# Patient Record
Sex: FEMALE | Race: WHITE | NOT HISPANIC OR LATINO | Employment: FULL TIME | URBAN - METROPOLITAN AREA
[De-identification: names, ages, dates, MRNs, and addresses within clinical notes are randomized per-mention and may not be internally consistent; named-entity substitution may affect disease eponyms.]

---

## 2017-05-01 ENCOUNTER — OFFICE VISIT (OUTPATIENT)
Dept: URGENT CARE | Facility: PHYSICIAN GROUP | Age: 57
End: 2017-05-01
Payer: COMMERCIAL

## 2017-05-01 VITALS
TEMPERATURE: 99.7 F | OXYGEN SATURATION: 97 % | BODY MASS INDEX: 20.59 KG/M2 | WEIGHT: 152 LBS | HEIGHT: 72 IN | HEART RATE: 87 BPM | RESPIRATION RATE: 16 BRPM | SYSTOLIC BLOOD PRESSURE: 116 MMHG | DIASTOLIC BLOOD PRESSURE: 60 MMHG

## 2017-05-01 DIAGNOSIS — R21 RASH: ICD-10-CM

## 2017-05-01 PROCEDURE — 99204 OFFICE O/P NEW MOD 45 MIN: CPT | Performed by: FAMILY MEDICINE

## 2017-05-01 RX ORDER — IBUPROFEN 200 MG
600 TABLET ORAL EVERY 6 HOURS PRN
COMMUNITY

## 2017-05-01 RX ORDER — FAMOTIDINE 40 MG/1
40 TABLET, FILM COATED ORAL DAILY
Qty: 60 TAB | Refills: 0 | Status: SHIPPED | OUTPATIENT
Start: 2017-05-01 | End: 2017-07-04

## 2017-05-01 RX ORDER — DIPHENHYDRAMINE HCL 25 MG
25 TABLET ORAL EVERY 6 HOURS PRN
COMMUNITY

## 2017-05-01 RX ORDER — PREDNISONE 10 MG/1
TABLET ORAL
Qty: 91 TAB | Refills: 0 | Status: SHIPPED | OUTPATIENT
Start: 2017-05-01 | End: 2017-07-04

## 2017-05-01 RX ORDER — AMOXICILLIN AND CLAVULANATE POTASSIUM 875; 125 MG/1; MG/1
1 TABLET, FILM COATED ORAL 2 TIMES DAILY
Qty: 14 TAB | Refills: 0 | Status: SHIPPED | OUTPATIENT
Start: 2017-05-01 | End: 2017-05-08

## 2017-05-01 NOTE — MR AVS SNAPSHOT
Brittaney Kelley   2017 3:30 PM   Office Visit   MRN: [id number]    Department:  [facility]   Dept Phone:  [phone]    Description:  Female : 1960   Provider:  Alejandro Hand M.D.           Reason for Visit     Rash Painful, itchy rash all over body x 1 wk       Allergies as of 2017     No Known Allergies      You were diagnosed with     Rash   [[id number]]         Vital Signs     Blood Pressure Pulse Temperature Respirations Height Weight    116/60 mmHg 87 37.6 °C (99.7 °F) 16 1.829 m (6') 68.947 kg (152 lb)    Body Mass Index Oxygen Saturation                20.61 kg/m2 97%          Basic Information     Date Of Birth Sex Race Ethnicity Preferred Language    1960 Female White Non- English      Health Maintenance     Patient has no pending health maintenance at this time      Current Immunizations     No immunizations on file.      Below and/or attached are the medications your provider expects you to take. Review all of your home medications and newly ordered medications with your provider and/or pharmacist. Follow medication instructions as directed by your provider and/or pharmacist. Please keep your medication list with you and share with your provider. Update the information when medications are discontinued, doses are changed, or new medications (including over-the-counter products) are added; and carry medication information at all times in the event of emergency situations     Allergies:  No Known Allergies          Medications  Valid as of: May 01, 2017 -  4:12 PM    Generic Name Brand Name Tablet Size Instructions for use    DiphenhydrAMINE HCl (Tab) BENADRYL 25 MG Take 25 mg by mouth every 6 hours as needed for Sleep.        Ibuprofen (Tab) MOTRIN 200 MG Take 200 mg by mouth every 6 hours as needed.        Multiple Vitamin   Take  by mouth.        .                 Medicines prescribed today were sent to:     [facility]ACY #[id number] - [place], [place] - [address]       [address] [place] [address]    Phone: [phone] Fax: [phone]    Open 24 Hours?: No      Medication refill instructions:       If your prescription bottle indicates you have medication refills left, it is not necessary to call your provider’s office. Please contact your pharmacy and they will refill your medication.    If your prescription bottle indicates you do not have any refills left, you may request refills at any time through one of the following ways: The online [employer] system (except Urgent Care), by calling your provider’s office, or by asking your pharmacy to contact your provider’s office with a refill request. Medication refills are processed only during regular business hours and may not be available until the next business day. Your provider may request additional information or to have a follow-up visit with you prior to refilling your medication.   *Please Note: Medication refills are assigned a new Rx number when refilled electronically. Your pharmacy may indicate that no refills were authorized even though a new prescription for the same medication is available at the pharmacy. Please request the medicine by name with the pharmacy before contacting your provider for a refill.           [employer] Access Code: [email or web address]  Expires: 5/31/2017  3:32 PM    [employer]  A secure, online tool to manage your health information     [employer]’s [employer]® is a secure, online tool that connects you to your personalized health information from the privacy of your home -- day or night - making it very easy for you to manage your healthcare. Once the activation process is completed, you can even access your medical information using the [employer] dejah, which is available for free in the Apple Dejah store or Google Play store.     [employer] provides the following levels of access (as shown below):   My Chart Features   [facility] Doctor [facility]  Specialists [facility]  Urgent  Care Non-[facility]  Primary  Care  Doctor   Email your healthcare team securely and privately 24/7 X X X    Manage appointments: schedule your next appointment; view details of past/upcoming appointments X      Request prescription refills. X      View recent personal medical records, including lab and immunizations X X X X   View health record, including health history, allergies, medications X X X X   Read reports about your outpatient visits, procedures, consult and ER notes X X X X   See your discharge summary, which is a recap of your hospital and/or ER visit that includes your diagnosis, lab results, and care plan. X X       How to register for [employer]:  1. Go to  [email or web address].  2. Click on the Sign Up Now box, which takes you to the New Member Sign Up page. You will need to provide the following information:  a. Enter your [employer] Access Code exactly as it appears at the top of this page. (You will not need to use this code after you’ve completed the sign-up process. If you do not sign up before the expiration date, you must request a new code.)   b. Enter your date of birth.   c. Enter your home email address.   d. Click Submit, and follow the next screen’s instructions.  3. Create a [employer] ID. This will be your [employer] login ID and cannot be changed, so think of one that is secure and easy to remember.  4. Create a [employer] password. You can change your password at any time.  5. Enter your Password Reset Question and Answer. This can be used at a later time if you forget your password.   6. Enter your e-mail address. This allows you to receive e-mail notifications when new information is available in [employer].  7. Click Sign Up. You can now view your health information.    For assistance activating your [employer] account, call [phone]

## 2017-05-01 NOTE — Clinical Note
May 1, 2017         Patient: Brittaney Kelley   YOB: 1960   Date of Visit: 5/1/2017           To Whom it May Concern:    Brittaney Kelley was seen in my clinic on 5/1/2017. She may return to work on 5/4/2017..    If you have any questions or concerns, please don't hesitate to call.        Sincerely,           Alejandro Hand M.D.  Electronically Signed

## 2017-05-01 NOTE — Clinical Note
May 1, 2017         Patient: Brittaney Kelley   YOB: 1960   Date of Visit: 5/1/2017           To Whom it May Concern:    Brittaney Kelley was seen in my clinic on 5/1/2017. She may return to work on 5/3/2017..    If you have any questions or concerns, please don't hesitate to call.        Sincerely,           Alejandro Hand M.D.  Electronically Signed

## 2017-05-01 NOTE — PATIENT INSTRUCTIONS
Rash  A rash is a change in the color or texture of the skin. There are many different types of rashes. You may have other problems that accompany your rash.  CAUSES   · Infections.  · Allergic reactions. This can include allergies to pets or foods.  · Certain medicines.  · Exposure to certain chemicals, soaps, or cosmetics.  · Heat.  · Exposure to poisonous plants.  · Tumors, both cancerous and noncancerous.  SYMPTOMS   · Redness.  · Scaly skin.  · Itchy skin.  · Dry or cracked skin.  · Bumps.  · Blisters.  · Pain.  DIAGNOSIS   Your caregiver may do a physical exam to determine what type of rash you have. A skin sample (biopsy) may be taken and examined under a microscope.  TREATMENT   Treatment depends on the type of rash you have. Your caregiver may prescribe certain medicines. For serious conditions, you may need to see a skin doctor (dermatologist).  HOME CARE INSTRUCTIONS   · Avoid the substance that caused your rash.  · Do not scratch your rash. This can cause infection.  · You may take cool baths to help stop itching.  · Only take over-the-counter or prescription medicines as directed by your caregiver.  · Keep all follow-up appointments as directed by your caregiver.  SEEK IMMEDIATE MEDICAL CARE IF:  · You have increasing pain, swelling, or redness.  · You have a fever.  · You have new or severe symptoms.  · You have body aches, diarrhea, or vomiting.  · Your rash is not better after 3 days.  MAKE SURE YOU:  · Understand these instructions.  · Will watch your condition.  · Will get help right away if you are not doing well or get worse.     This information is not intended to replace advice given to you by your health care provider. Make sure you discuss any questions you have with your health care provider.     Document Released: 12/08/2003 Document Revised: 01/08/2016 Document Reviewed: 10/01/2012  [employer] Interactive Patient Education ©2016 [employer]

## 2017-05-16 ASSESSMENT — ENCOUNTER SYMPTOMS
VOMITING: 0
FEVER: 0

## 2017-05-16 NOTE — PROGRESS NOTES
Subjective:      Brittaney Kelley is a 56 y.o. female who presents with Rash            Rash  This is a new problem. The current episode started in the past 7 days. The problem has been rapidly worsening since onset. The rash is diffuse. The rash is characterized by pain and redness. She was exposed to an insect bite/sting. Pertinent negatives include no fever or vomiting.       Review of Systems   Constitutional: Negative for fever.   Gastrointestinal: Negative for vomiting.   Skin: Positive for rash.     PMH:  has no past medical history of Cancer (CMS-[facility]), Diabetes (CMS-HCC), Hypertension, or Hyperlipidemia.  MEDS:   Current outpatient prescriptions:   •  ibuprofen (MOTRIN) 200 MG Tab, Take 200 mg by mouth every 6 hours as needed., Disp: , Rfl:   •  diphenhydrAMINE (BENADRYL) 25 MG Tab, Take 25 mg by mouth every 6 hours as needed for Sleep., Disp: , Rfl:   •  Multiple Vitamin (DAILY VITAMINS PO), Take  by mouth., Disp: , Rfl:   •  predniSONE (DELTASONE) 10 MG Tab, Take 6 tablets daily for 7 days then take 4 tablets daily for 7 days then 2 tablets for 7 days then 1 tablet for 7 days., Disp: 91 Tab, Rfl: 0  •  famotidine (PEPCID) 40 MG Tab, Take 1 Tab by mouth every day., Disp: 60 Tab, Rfl: 0  ALLERGIES: No Known Allergies  SURGHX: History reviewed. No pertinent past surgical history.  SOCHX:  reports that she has quit smoking. She does not have any smokeless tobacco history on file. She reports that she drinks alcohol.  FH: family history is negative for Stroke and Heart Disease.      Objective:     /60 mmHg  Pulse 87  Temp(Src) 37.6 °C (99.7 °F)  Resp 16  Ht 1.829 m (6')  Wt 68.947 kg (152 lb)  BMI 20.61 kg/m2  SpO2 97%     Physical Exam   Constitutional: She is oriented to person, place, and time. She appears well-developed and well-nourished. No distress.   HENT:   Head: Normocephalic and atraumatic.   Eyes: Conjunctivae and EOM are normal. Pupils are equal, round, and reactive to light.    Cardiovascular: Normal rate and regular rhythm.    No murmur heard.  Pulmonary/Chest: Effort normal and breath sounds normal. No respiratory distress.   Abdominal: Soft. She exhibits no distension. There is no tenderness.   Neurological: She is alert and oriented to person, place, and time. She has normal reflexes. No sensory deficit.   Skin: Skin is warm and dry. Rash noted. Rash is pustular.   Psychiatric: She has a normal mood and affect.               Assessment/Plan:     1. Rash  Differential diagnosis, natural history, supportive care, and indications for immediate follow-up discussed.   - amoxicillin-clavulanate (AUGMENTIN) 875-125 MG Tab; Take 1 Tab by mouth 2 times a day for 7 days.  Dispense: 14 Tab; Refill: 0  - predniSONE (DELTASONE) 10 MG Tab; Take 6 tablets daily for 7 days then take 4 tablets daily for 7 days then 2 tablets for 7 days then 1 tablet for 7 days.  Dispense: 91 Tab; Refill: 0  - famotidine (PEPCID) 40 MG Tab; Take 1 Tab by mouth every day.  Dispense: 60 Tab; Refill: 0

## 2017-07-04 ENCOUNTER — HOSPITAL ENCOUNTER (INPATIENT)
Facility: MEDICAL CENTER | Age: 57
LOS: 2 days | DRG: 603 | End: 2017-07-06
Attending: EMERGENCY MEDICINE | Admitting: INTERNAL MEDICINE
Payer: COMMERCIAL

## 2017-07-04 ENCOUNTER — OFFICE VISIT (OUTPATIENT)
Dept: URGENT CARE | Facility: PHYSICIAN GROUP | Age: 57
End: 2017-07-04
Payer: COMMERCIAL

## 2017-07-04 ENCOUNTER — RESOLUTE PROFESSIONAL BILLING HOSPITAL PROF FEE (OUTPATIENT)
Dept: HOSPITALIST | Facility: MEDICAL CENTER | Age: 57
End: 2017-07-04
Payer: COMMERCIAL

## 2017-07-04 VITALS
BODY MASS INDEX: 20.45 KG/M2 | OXYGEN SATURATION: 96 % | HEIGHT: 72 IN | SYSTOLIC BLOOD PRESSURE: 108 MMHG | HEART RATE: 74 BPM | DIASTOLIC BLOOD PRESSURE: 60 MMHG | WEIGHT: 151 LBS | TEMPERATURE: 99 F

## 2017-07-04 DIAGNOSIS — L03.116 BILATERAL CELLULITIS OF LOWER LEG: ICD-10-CM

## 2017-07-04 DIAGNOSIS — L03.115 BILATERAL CELLULITIS OF LOWER LEG: ICD-10-CM

## 2017-07-04 DIAGNOSIS — L03.119 CELLULITIS OF LOWER EXTREMITY, UNSPECIFIED LATERALITY: ICD-10-CM

## 2017-07-04 LAB
ALBUMIN SERPL BCP-MCNC: 3.9 G/DL (ref 3.2–4.9)
ALBUMIN/GLOB SERPL: 1.1 G/DL
ALP SERPL-CCNC: 75 U/L (ref 30–99)
ALT SERPL-CCNC: 11 U/L (ref 2–50)
ANION GAP SERPL CALC-SCNC: 10 MMOL/L (ref 0–11.9)
APPEARANCE UR: CLEAR
APTT PPP: 29.9 SEC (ref 24.7–36)
AST SERPL-CCNC: 17 U/L (ref 12–45)
BACTERIA #/AREA URNS HPF: ABNORMAL /HPF
BASOPHILS # BLD AUTO: 1 % (ref 0–1.8)
BASOPHILS # BLD: 0.11 K/UL (ref 0–0.12)
BILIRUB SERPL-MCNC: 0.4 MG/DL (ref 0.1–1.5)
BILIRUB UR QL STRIP.AUTO: NEGATIVE
BUN SERPL-MCNC: 21 MG/DL (ref 8–22)
CALCIUM SERPL-MCNC: 9.3 MG/DL (ref 8.5–10.5)
CHLORIDE SERPL-SCNC: 106 MMOL/L (ref 96–112)
CO2 SERPL-SCNC: 23 MMOL/L (ref 20–33)
COLOR UR: YELLOW
CREAT SERPL-MCNC: 0.96 MG/DL (ref 0.5–1.4)
CRP SERPL HS-MCNC: 4.14 MG/DL (ref 0–0.75)
EOSINOPHIL # BLD AUTO: 0.38 K/UL (ref 0–0.51)
EOSINOPHIL NFR BLD: 3.3 % (ref 0–6.9)
EPI CELLS #/AREA URNS HPF: NEGATIVE /HPF
ERYTHROCYTE [DISTWIDTH] IN BLOOD BY AUTOMATED COUNT: 45.8 FL (ref 35.9–50)
ERYTHROCYTE [SEDIMENTATION RATE] IN BLOOD BY WESTERGREN METHOD: 47 MM/HOUR (ref 0–30)
GFR SERPL CREATININE-BSD FRML MDRD: >60 ML/MIN/1.73 M 2
GLOBULIN SER CALC-MCNC: 3.4 G/DL (ref 1.9–3.5)
GLUCOSE SERPL-MCNC: 89 MG/DL (ref 65–99)
GLUCOSE UR STRIP.AUTO-MCNC: NEGATIVE MG/DL
GRAM STN SPEC: NORMAL
HCT VFR BLD AUTO: 40.1 % (ref 37–47)
HGB BLD-MCNC: 13.6 G/DL (ref 12–16)
HYALINE CASTS #/AREA URNS LPF: ABNORMAL /LPF
IMM GRANULOCYTES # BLD AUTO: 0.03 K/UL (ref 0–0.11)
IMM GRANULOCYTES NFR BLD AUTO: 0.3 % (ref 0–0.9)
INR PPP: 0.93 (ref 0.87–1.13)
KETONES UR STRIP.AUTO-MCNC: NEGATIVE MG/DL
LACTATE BLD-SCNC: 1.1 MMOL/L (ref 0.5–2)
LEUKOCYTE ESTERASE UR QL STRIP.AUTO: ABNORMAL
LYMPHOCYTES # BLD AUTO: 3.42 K/UL (ref 1–4.8)
LYMPHOCYTES NFR BLD: 30.1 % (ref 22–41)
MCH RBC QN AUTO: 32.4 PG (ref 27–33)
MCHC RBC AUTO-ENTMCNC: 33.9 G/DL (ref 33.6–35)
MCV RBC AUTO: 95.5 FL (ref 81.4–97.8)
MICRO URNS: ABNORMAL
MONOCYTES # BLD AUTO: 0.95 K/UL (ref 0–0.85)
MONOCYTES NFR BLD AUTO: 8.4 % (ref 0–13.4)
MRSA DNA SPEC QL NAA+PROBE: NORMAL
NEUTROPHILS # BLD AUTO: 6.47 K/UL (ref 2–7.15)
NEUTROPHILS NFR BLD: 56.9 % (ref 44–72)
NITRITE UR QL STRIP.AUTO: POSITIVE
NRBC # BLD AUTO: 0 K/UL
NRBC BLD AUTO-RTO: 0 /100 WBC
PH UR STRIP.AUTO: 5.5 [PH]
PLATELET # BLD AUTO: 434 K/UL (ref 164–446)
PMV BLD AUTO: 9.8 FL (ref 9–12.9)
POTASSIUM SERPL-SCNC: 3.9 MMOL/L (ref 3.6–5.5)
PROT SERPL-MCNC: 7.3 G/DL (ref 6–8.2)
PROT UR QL STRIP: NEGATIVE MG/DL
PROTHROMBIN TIME: 12.8 SEC (ref 12–14.6)
RBC # BLD AUTO: 4.2 M/UL (ref 4.2–5.4)
RBC # URNS HPF: ABNORMAL /HPF
RBC UR QL AUTO: ABNORMAL
SIGNIFICANT IND 70042: NORMAL
SIGNIFICANT IND 70042: NORMAL
SITE SITE: NORMAL
SITE SITE: NORMAL
SODIUM SERPL-SCNC: 139 MMOL/L (ref 135–145)
SOURCE SOURCE: NORMAL
SOURCE SOURCE: NORMAL
SP GR UR STRIP.AUTO: 1.01
WBC # BLD AUTO: 11.4 K/UL (ref 4.8–10.8)
WBC #/AREA URNS HPF: ABNORMAL /HPF

## 2017-07-04 PROCEDURE — A9270 NON-COVERED ITEM OR SERVICE: HCPCS | Performed by: INTERNAL MEDICINE

## 2017-07-04 PROCEDURE — 85730 THROMBOPLASTIN TIME PARTIAL: CPT

## 2017-07-04 PROCEDURE — 700111 HCHG RX REV CODE 636 W/ 250 OVERRIDE (IP): Performed by: INTERNAL MEDICINE

## 2017-07-04 PROCEDURE — 87641 MR-STAPH DNA AMP PROBE: CPT

## 2017-07-04 PROCEDURE — 85610 PROTHROMBIN TIME: CPT

## 2017-07-04 PROCEDURE — 96366 THER/PROPH/DIAG IV INF ADDON: CPT

## 2017-07-04 PROCEDURE — 94760 N-INVAS EAR/PLS OXIMETRY 1: CPT

## 2017-07-04 PROCEDURE — 99285 EMERGENCY DEPT VISIT HI MDM: CPT

## 2017-07-04 PROCEDURE — 93970 EXTREMITY STUDY: CPT

## 2017-07-04 PROCEDURE — 700105 HCHG RX REV CODE 258: Performed by: INTERNAL MEDICINE

## 2017-07-04 PROCEDURE — 85025 COMPLETE CBC W/AUTO DIFF WBC: CPT

## 2017-07-04 PROCEDURE — 700111 HCHG RX REV CODE 636 W/ 250 OVERRIDE (IP): Performed by: EMERGENCY MEDICINE

## 2017-07-04 PROCEDURE — 96367 TX/PROPH/DG ADDL SEQ IV INF: CPT

## 2017-07-04 PROCEDURE — 700105 HCHG RX REV CODE 258

## 2017-07-04 PROCEDURE — 87205 SMEAR GRAM STAIN: CPT

## 2017-07-04 PROCEDURE — 700102 HCHG RX REV CODE 250 W/ 637 OVERRIDE(OP): Performed by: INTERNAL MEDICINE

## 2017-07-04 PROCEDURE — 99223 1ST HOSP IP/OBS HIGH 75: CPT | Performed by: INTERNAL MEDICINE

## 2017-07-04 PROCEDURE — 87040 BLOOD CULTURE FOR BACTERIA: CPT | Mod: 91

## 2017-07-04 PROCEDURE — 81001 URINALYSIS AUTO W/SCOPE: CPT

## 2017-07-04 PROCEDURE — 87077 CULTURE AEROBIC IDENTIFY: CPT

## 2017-07-04 PROCEDURE — 87070 CULTURE OTHR SPECIMN AEROBIC: CPT

## 2017-07-04 PROCEDURE — 83605 ASSAY OF LACTIC ACID: CPT

## 2017-07-04 PROCEDURE — 85652 RBC SED RATE AUTOMATED: CPT

## 2017-07-04 PROCEDURE — 700111 HCHG RX REV CODE 636 W/ 250 OVERRIDE (IP)

## 2017-07-04 PROCEDURE — 80053 COMPREHEN METABOLIC PANEL: CPT

## 2017-07-04 PROCEDURE — 86140 C-REACTIVE PROTEIN: CPT

## 2017-07-04 PROCEDURE — 770006 HCHG ROOM/CARE - MED/SURG/GYN SEMI*

## 2017-07-04 PROCEDURE — 87186 SC STD MICRODIL/AGAR DIL: CPT

## 2017-07-04 PROCEDURE — 700105 HCHG RX REV CODE 258: Performed by: EMERGENCY MEDICINE

## 2017-07-04 PROCEDURE — 96365 THER/PROPH/DIAG IV INF INIT: CPT

## 2017-07-04 RX ORDER — LABETALOL HYDROCHLORIDE 5 MG/ML
10 INJECTION, SOLUTION INTRAVENOUS EVERY 4 HOURS PRN
Status: DISCONTINUED | OUTPATIENT
Start: 2017-07-04 | End: 2017-07-05

## 2017-07-04 RX ORDER — AMOXICILLIN 250 MG
2 CAPSULE ORAL 2 TIMES DAILY
Status: DISCONTINUED | OUTPATIENT
Start: 2017-07-04 | End: 2017-07-06 | Stop reason: HOSPADM

## 2017-07-04 RX ORDER — ACETAMINOPHEN 325 MG/1
650 TABLET ORAL EVERY 6 HOURS PRN
Status: ON HOLD | COMMUNITY
End: 2017-07-06

## 2017-07-04 RX ORDER — DIPHENHYDRAMINE HYDROCHLORIDE 50 MG/ML
12.5 INJECTION INTRAMUSCULAR; INTRAVENOUS EVERY 6 HOURS PRN
Status: DISCONTINUED | OUTPATIENT
Start: 2017-07-04 | End: 2017-07-05

## 2017-07-04 RX ORDER — M-VIT,TX,IRON,MINS/CALC/FOLIC 27MG-0.4MG
1 TABLET ORAL DAILY
Status: DISCONTINUED | OUTPATIENT
Start: 2017-07-05 | End: 2017-07-06 | Stop reason: HOSPADM

## 2017-07-04 RX ORDER — KETOROLAC TROMETHAMINE 30 MG/ML
30 INJECTION, SOLUTION INTRAMUSCULAR; INTRAVENOUS EVERY 6 HOURS PRN
Status: DISCONTINUED | OUTPATIENT
Start: 2017-07-04 | End: 2017-07-05

## 2017-07-04 RX ORDER — LORATADINE 10 MG/1
10 TABLET ORAL DAILY
Status: DISCONTINUED | OUTPATIENT
Start: 2017-07-04 | End: 2017-07-06 | Stop reason: HOSPADM

## 2017-07-04 RX ORDER — ACETAMINOPHEN 325 MG/1
650 TABLET ORAL EVERY 6 HOURS PRN
Status: DISCONTINUED | OUTPATIENT
Start: 2017-07-04 | End: 2017-07-05

## 2017-07-04 RX ORDER — SODIUM CHLORIDE 9 MG/ML
INJECTION, SOLUTION INTRAVENOUS CONTINUOUS
Status: DISCONTINUED | OUTPATIENT
Start: 2017-07-04 | End: 2017-07-06 | Stop reason: HOSPADM

## 2017-07-04 RX ORDER — BISACODYL 10 MG
10 SUPPOSITORY, RECTAL RECTAL
Status: DISCONTINUED | OUTPATIENT
Start: 2017-07-04 | End: 2017-07-06 | Stop reason: HOSPADM

## 2017-07-04 RX ORDER — AMPICILLIN AND SULBACTAM 2; 1 G/1; G/1
3 INJECTION, POWDER, FOR SOLUTION INTRAMUSCULAR; INTRAVENOUS ONCE
Status: COMPLETED | OUTPATIENT
Start: 2017-07-04 | End: 2017-07-04

## 2017-07-04 RX ORDER — POLYETHYLENE GLYCOL 3350 17 G/17G
1 POWDER, FOR SOLUTION ORAL
Status: DISCONTINUED | OUTPATIENT
Start: 2017-07-04 | End: 2017-07-06 | Stop reason: HOSPADM

## 2017-07-04 RX ORDER — IBUPROFEN 600 MG/1
600 TABLET ORAL EVERY 6 HOURS PRN
Status: DISCONTINUED | OUTPATIENT
Start: 2017-07-04 | End: 2017-07-06 | Stop reason: HOSPADM

## 2017-07-04 RX ORDER — M-VIT,TX,IRON,MINS/CALC/FOLIC 27MG-0.4MG
1 TABLET ORAL DAILY
COMMUNITY

## 2017-07-04 RX ADMIN — VANCOMYCIN HYDROCHLORIDE 1700 MG: 100 INJECTION, POWDER, LYOPHILIZED, FOR SOLUTION INTRAVENOUS at 15:49

## 2017-07-04 RX ADMIN — SODIUM CHLORIDE: 9 INJECTION, SOLUTION INTRAVENOUS at 15:50

## 2017-07-04 RX ADMIN — SODIUM CHLORIDE: 9 INJECTION, SOLUTION INTRAVENOUS at 23:57

## 2017-07-04 RX ADMIN — AMPICILLIN SODIUM AND SULBACTAM SODIUM 3 G: 2; 1 INJECTION, POWDER, FOR SOLUTION INTRAMUSCULAR; INTRAVENOUS at 23:57

## 2017-07-04 RX ADMIN — AMPICILLIN SODIUM AND SULBACTAM SODIUM 3 G: 2; 1 INJECTION, POWDER, FOR SOLUTION INTRAMUSCULAR; INTRAVENOUS at 15:15

## 2017-07-04 RX ADMIN — LORATADINE 10 MG: 10 TABLET ORAL at 20:11

## 2017-07-04 ASSESSMENT — LIFESTYLE VARIABLES
ALCOHOL_USE: NO
EVER_SMOKED: YES
DO YOU DRINK ALCOHOL: NO

## 2017-07-04 ASSESSMENT — PAIN SCALES - GENERAL
PAINLEVEL_OUTOF10: 8
PAINLEVEL_OUTOF10: 3

## 2017-07-04 NOTE — IP AVS SNAPSHOT
Home Care Instructions                                                                                                                  Name:Brittaney Kelley  Medical Record Number:[id number]  CSN: [id number]    YOB: 1960   Age: 56 y.o.  Sex: female  HT:1.829 m (6') WT: 64.7 kg (142 lb 10.2 oz)          Admit Date: 7/4/2017     Discharge Date:   Today's Date: 7/6/2017  Attending Doctor:  Anuel Lara M.D.                  Allergies:  Review of patient's allergies indicates no known allergies.            Discharge Instructions       Discharge Instructions    Discharged to home by car with self. Discharged via walking, hospital escort: Yes.  Special equipment needed: Not Applicable    Be sure to schedule a follow-up appointment with your primary care doctor or any specialists as instructed.     Discharge Plan:   Diet Plan: Discussed  Activity Level: Discussed  Smoking Cessation Offered: Patient Refused  Confirmed Follow up Appointment: Patient to Call and Schedule Appointment  Confirmed Symptoms Management: Discussed  Medication Reconciliation Updated: Yes  Influenza Vaccine Indication: Patient Refuses    I understand that a diet low in cholesterol, fat, and sodium is recommended for good health. Unless I have been given specific instructions below for another diet, I accept this instruction as my diet prescription.   Other diet: regular      Special Instructions: None    · Is patient discharged on Warfarin / Coumadin?   No     · Is patient Post Blood Transfusion?  No  Cellulitis  Cellulitis is an infection of the skin and the tissue under the skin. The infected area is usually red and tender. This happens most often in the arms and lower legs.  HOME CARE   · Take your antibiotic medicine as told. Finish the medicine even if you start to feel better.  · Keep the infected arm or leg raised (elevated).  · Put a warm cloth on the area up to 4 times per day.  · Only take medicines as told by your  doctor.  · Keep all doctor visits as told.  GET HELP IF:  · You see red streaks on the skin coming from the infected area.  · Your red area gets bigger or turns a dark color.  · Your bone or joint under the infected area is painful after the skin heals.  · Your infection comes back in the same area or different area.  · You have a puffy (swollen) bump in the infected area.  · You have new symptoms.  · You have a fever.  GET HELP RIGHT AWAY IF:   · You feel very sleepy.  · You throw up (vomit) or have watery poop (diarrhea).  · You feel sick and have muscle aches and pains.  MAKE SURE YOU:   · Understand these instructions.  · Will watch your condition.  · Will get help right away if you are not doing well or get worse.     This information is not intended to replace advice given to you by your health care provider. Make sure you discuss any questions you have with your health care provider.     Document Released: 06/05/2009 Document Revised: 01/08/2016 Document Reviewed: 03/04/2013  [employer] Interactive Patient Education ©2016 [employer]      Depression / Suicide Risk    As you are discharged from this [facility] facility, it is important to learn how to keep safe from harming yourself.    Recognize the warning signs:  · Abrupt changes in personality, positive or negative- including increase in energy   · Giving away possessions  · Change in eating patterns- significant weight changes-  positive or negative  · Change in sleeping patterns- unable to sleep or sleeping all the time   · Unwillingness or inability to communicate  · Depression  · Unusual sadness, discouragement and loneliness  · Talk of wanting to die  · Neglect of personal appearance   · Rebelliousness- reckless behavior  · Withdrawal from people/activities they love  · Confusion- inability to concentrate     If you or a loved one observes any of these behaviors or has concerns about self-harm, here's what you can do:  · Talk about it- your  feelings and reasons for harming yourself  · Remove any means that you might use to hurt yourself (examples: pills, rope, extension cords, firearm)  · Get professional help from the community (Mental Health, Substance Abuse, psychological counseling)  · Do not be alone:Call your Safe Contact- someone whom you trust who will be there for you.  · Call your local CRISIS HOTLINE 279-3132 or 839-375-5493  · Call your local Children's Mobile Crisis Response Team [place] (897) 916-4004 or wwwBABADU  · Call the toll free National Suicide Prevention Hotlines   · National Suicide Prevention Lifeline 441-313-ZCCX (3483)  · [employer] Hope Line Network 800-SUICIDE (335-0454)             Discharge Medication Instructions:    Below are the medications your physician expects you to take upon discharge:    Review all your home medications and newly ordered medications with your doctor and/or pharmacist. Follow medication instructions as directed by your doctor and/or pharmacist.    Please keep your medication list with you and share with your physician.               Medication List      START taking these medications        Instructions    Morning Afternoon Evening Bedtime    amoxicillin-clavulanate 875-125 MG Tabs   Commonly known as:  AUGMENTIN        Take 1 Tab by mouth 2 times a day.   Dose:  1 Tab                        diphenhydramine-ZnAcetate 1-0.1 % Crea   Last time this was given:  7/6/2017  9:00 AM   Commonly known as:  BENADRYL ITCH                             hydrOXYzine 25 MG Tabs   Commonly known as:  ATARAX        Take 1 Tab by mouth 3 times a day as needed for Itching.   Dose:  25 mg                          CHANGE how you take these medications        Instructions    Morning Afternoon Evening Bedtime    acetaminophen 325 MG Tabs   What changed:  reasons to take this   Commonly known as:  TYLENOL        Take 2 Tabs by mouth every 6 hours as needed for Fever or Moderate Pain (Temp >101.5F).   Dose:   650 mg                          CONTINUE taking these medications        Instructions    Morning Afternoon Evening Bedtime    acidophilus/bulgaricus Tabs tablet   Commonly known as:  LACTINEX        Take 2 Tabs by mouth 3 times a day, with meals.   Dose:  2 Tab                        diphenhydrAMINE 25 MG Tabs   Commonly known as:  BENADRYL        Take 25 mg by mouth every 6 hours as needed for Itching.   Dose:  25 mg                        ibuprofen 200 MG Tabs   Last time this was given:  600 mg on 7/5/2017 10:02 PM   Commonly known as:  MOTRIN        Take 600 mg by mouth every 6 hours as needed for Mild Pain or Inflammation.   Dose:  600 mg                        therapeutic multivitamin-minerals Tabs   Last time this was given:  1 Tab on 7/6/2017  9:00 AM        Take 1 Tab by mouth every day.   Dose:  1 Tab                             Where to Get Your Medications      These medications were sent to [facility]ARMACY #[id number] - [place], [place] - [address]  [address], [place] [address]     Phone:  [phone]    - amoxicillin-clavulanate 875-125 MG Tabs  - hydrOXYzine 25 MG Tabs            Instructions           Diet / Nutrition:    Follow any diet instructions given to you by your doctor or the dietician, including how much salt (sodium) you are allowed each day.    If you are overweight, talk to your doctor about a weight reduction plan.    Activity:    Remain physically active following your doctor's instructions about exercise and activity.    Rest often.     Any time you become even a little tired or short of breath, SIT DOWN and rest.    Worsening Symptoms:    Report any of the following signs and symptoms to the doctor's office immediately:    *Pain of jaw, arm, or neck  *Chest pain not relieved by medication                               *Dizziness or loss of consciousness  *Difficulty breathing even when at rest   *More tired than usual                                       *Bleeding drainage or swelling of  surgical site  *Swelling of feet, ankles, legs or stomach                 *Fever (>100ºF)  *Pink or blood tinged sputum  *Weight gain (3lbs/day or 5lbs /week)           *Shock from internal defibrillator (if applicable)  *Palpitations or irregular heartbeats                *Cool and/or numb extremities    Stroke Awareness    Common Risk Factors for Stroke include:    Age  Atrial Fibrillation  Carotid Artery Stenosis  Diabetes Mellitus  Excessive alcohol consumption  High blood pressure  Overweight   Physical inactivity  Smoking    Warning signs and symptoms of a stroke include:    *Sudden numbness or weakness of the face, arm or leg (especially on one side of the body).  *Sudden confusion, trouble speaking or understanding.  *Sudden trouble seeing in one or both eyes.  *Sudden trouble walking, dizziness, loss of balance or coordination.Sudden severe headache with no known cause.    It is very important to get treatment quickly when a stroke occurs. If you experience any of the above warning signs, call 911 immediately.                   Disclaimer         Quit Smoking / Tobacco Use:    I understand the use of any tobacco products increases my chance of suffering from future heart disease or stroke and could cause other illnesses which may shorten my life. Quitting the use of tobacco products is the single most important thing I can do to improve my health. For further information on smoking / tobacco cessation call a Toll Free Quit Line at 1-660.460.7369 (*National Cancer [place]) or 1-375.385.2413 (American Lung Association) or you can access the web based program at www.lungusa.org.    [place] Tobacco Users Help Line:  (433) 525-7883       Toll Free: 1-792.382.2267  Quit Tobacco Program [facility] [phone]    Crisis Hotline:    [place] Crisis Hotline:  0-669-TXLOAEU or 1-919.121.8854    [place] Crisis Hotline:    1-729.864.5324 or 583-627-4253    Discharge Survey:   Thank you for  choosing [facility]. We hope we did everything we could to make your hospital stay a pleasant one. You may be receiving a phone survey and we would appreciate your time and participation in answering the questions. Your input is very valuable to us in our efforts to improve our service to our patients and their families.        My signature on this form indicates that:    1. I have reviewed and understand the above information.  2. My questions regarding this information have been answered to my satisfaction.  3. I have formulated a plan with my discharge nurse to obtain my prescribed medications for home.                  Disclaimer         __________________________________                     __________       ________                       Patient Signature                                                 Date                    Time

## 2017-07-04 NOTE — IP AVS SNAPSHOT
[facility] Access Code: [email or web address]  Expires: 8/3/2017  2:02 PM    [employer]  A secure, online tool to manage your health information     [employer]’s [employer]® is a secure, online tool that connects you to your personalized health information from the privacy of your home -- day or night - making it very easy for you to manage your healthcare. Once the activation process is completed, you can even access your medical information using the [employer] dejah, which is available for free in the Apple Dejah store or Google Play store.     [employer] provides the following levels of access (as shown below):   My Chart Features   [facility] Primary Care Doctor [facility]  Specialists [facility]  Urgent  Care Non-[facility]  Primary Care  Doctor   Email your healthcare team securely and privately 24/7 X X X X   Manage appointments: schedule your next appointment; view details of past/upcoming appointments X      Request prescription refills. X      View recent personal medical records, including lab and immunizations X X X X   View health record, including health history, allergies, medications X X X X   Read reports about your outpatient visits, procedures, consult and ER notes X X X X   See your discharge summary, which is a recap of your hospital and/or ER visit that includes your diagnosis, lab results, and care plan. X X       How to register for [employer]:  1. Go to  [email or web address].  2. Click on the Sign Up Now box, which takes you to the New Member Sign Up page. You will need to provide the following information:  a. Enter your [facility] Access Code exactly as it appears at the top of this page. (You will not need to use this code after you’ve completed the sign-up process. If you do not sign up before the expiration date, you must request a new code.)   b. Enter your date of birth.   c. Enter your home email address.   d. Click Submit, and follow the next screen’s instructions.  3. Create a [facility] ID. This will be your [facility]  login ID and cannot be changed, so think of one that is secure and easy to remember.  4. Create a [facility] password. You can change your password at any time.  5. Enter your Password Reset Question and Answer. This can be used at a later time if you forget your password.   6. Enter your e-mail address. This allows you to receive e-mail notifications when new information is available in [facility].  7. Click Sign Up. You can now view your health information.    For assistance activating your [facility] account, call [phone]

## 2017-07-04 NOTE — IP AVS SNAPSHOT
" <p align=\"LEFT\"><IMG SRC=\"//[email or web address]\" alt=\"Image\" WIDTH=\"50%\" HEIGHT=\"200\" BORDER=\"\"></p>                   Name:Brittaney Kelley  Medical Record Number:[id number]  CSN: [id number]    YOB: 1960   Age: 56 y.o.  Sex: female  HT:1.829 m (6') WT: 64.7 kg (142 lb 10.2 oz)          Admit Date: 7/4/2017     Discharge Date:   Today's Date: 7/6/2017  Attending Doctor:  Anuel Lara M.D.                  Allergies:  Review of patient's allergies indicates no known allergies.             Medication List      Take these Medications        Instructions    acetaminophen 325 MG Tabs   What changed:  reasons to take this   Commonly known as:  TYLENOL    Take 2 Tabs by mouth every 6 hours as needed for Fever or Moderate Pain (Temp >101.5F).   Dose:  650 mg       acidophilus/bulgaricus Tabs tablet   Commonly known as:  LACTINEX    Take 2 Tabs by mouth 3 times a day, with meals.   Dose:  2 Tab       amoxicillin-clavulanate 875-125 MG Tabs   Commonly known as:  AUGMENTIN    Take 1 Tab by mouth 2 times a day.   Dose:  1 Tab       diphenhydrAMINE 25 MG Tabs   Commonly known as:  BENADRYL    Take 25 mg by mouth every 6 hours as needed for Itching.   Dose:  25 mg       diphenhydramine-ZnAcetate 1-0.1 % Crea   Commonly known as:  BENADRYL ITCH        hydrOXYzine 25 MG Tabs   Commonly known as:  ATARAX    Take 1 Tab by mouth 3 times a day as needed for Itching.   Dose:  25 mg       ibuprofen 200 MG Tabs   Commonly known as:  MOTRIN    Take 600 mg by mouth every 6 hours as needed for Mild Pain or Inflammation.   Dose:  600 mg       therapeutic multivitamin-minerals Tabs    Take 1 Tab by mouth every day.   Dose:  1 Tab         "

## 2017-07-04 NOTE — ED NOTES
"Pt ambulatory to triage , states \" she has had problems with her legs on/off for years\" , was seen at the Urgent Care today and sent here, pt has bilateral leg redness/swelling/ and multiple open wounds to legs bilat   "

## 2017-07-04 NOTE — IP AVS SNAPSHOT
7/6/2017    Brittaney Kelley  [address]  [place] [address]    Dear Brittaney:    [facility] wants to ensure your discharge home is safe and you or your loved ones have had all of your questions answered regarding your care after you leave the hospital.    Below is a list of resources and contact information should you have any questions regarding your hospital stay, follow-up instructions, or active medical symptoms.    Questions or Concerns Regarding… Contact   Medical Questions Related to Your Discharge  (7 days a week, 8am-5pm) Contact a Nurse Care Coordinator   [phone]   Medical Questions Not Related to Your Discharge  (24 hours a day / 7 days a week)  Contact the Nurse Health Line   [phone]    Medications or Discharge Instructions Refer to your discharge packet   or contact your [facility] Primary Care Provider   [phone]   Follow-up Appointment(s) Schedule your appointment via "[employer]   or contact Scheduling [phone]   Billing Review your statement via "[employer]  or contact Billing [phone]   Medical Records Review your records via "[employer]   or contact Medical Records [phone]     You may receive a telephone call within two days of discharge. This call is to make certain you understand your discharge instructions and have the opportunity to have any questions answered. You can also easily access your medical information, test results and upcoming appointments via the "[employer] free online health management tool. You can learn more and sign up at [employer]. For assistance setting up your "[employer] account, please call [phone].    Once again, we want to ensure your discharge home is safe and that you have a clear understanding of any next steps in your care. If you have any questions or concerns, please do not hesitate to contact us, we are here for you. Thank you for choosing [facility] for your healthcare needs.    Sincerely,    Your [facility] Healthcare Team

## 2017-07-04 NOTE — MR AVS SNAPSHOT
"        Brittaney Kelley   2017 12:10 PM   Office Visit   MRN: [id number]    Department:  [facility]   Dept Phone:  [phone]    Description:  Female : 1960   Provider:  Enedina Holloway PA-C           Reason for Visit     Rash Bilateral redness, swelling, itching and pain of lower legs for several months. Previous tx not helping      Allergies as of 2017     No Known Allergies      Vital Signs     Blood Pressure Pulse Temperature Height Weight Body Mass Index    108/60 mmHg 74 37.2 °C (99 °F) 1.829 m (6' 0.01\") 68.493 kg (151 lb) 20.47 kg/m2    Oxygen Saturation Smoking Status                96% Former Smoker          Basic Information     Date Of Birth Sex Race Ethnicity Preferred Language    1960 Female White Non- English      Health Maintenance        Date Due Completion Dates    IMM DTaP/Tdap/Td Vaccine (1 - Tdap) 1981 ---    PAP SMEAR 1983 ---    MAMMOGRAM 2002 ---    COLONOSCOPY 2012 ---    IMM INFLUENZA (1) 2017 ---            Current Immunizations     No immunizations on file.      Below and/or attached are the medications your provider expects you to take. Review all of your home medications and newly ordered medications with your provider and/or pharmacist. Follow medication instructions as directed by your provider and/or pharmacist. Please keep your medication list with you and share with your provider. Update the information when medications are discontinued, doses are changed, or new medications (including over-the-counter products) are added; and carry medication information at all times in the event of emergency situations     Allergies:  No Known Allergies          Medications  Valid as of: 2017 -  2:02 PM    Generic Name Brand Name Tablet Size Instructions for use    DiphenhydrAMINE HCl (Tab) BENADRYL 25 MG Take 25 mg by mouth every 6 hours as needed for Sleep.        Famotidine (Tab) PEPCID 40 MG Take 1 Tab by mouth " every day.        Ibuprofen (Tab) MOTRIN 200 MG Take 200 mg by mouth every 6 hours as needed.        Multiple Vitamin   Take  by mouth.        PredniSONE (Tab) DELTASONE 10 MG Take 6 tablets daily for 7 days then take 4 tablets daily for 7 days then 2 tablets for 7 days then 1 tablet for 7 days.        .                 Medicines prescribed today were sent to:     [id number] - [place] - [address] [place] [address]    Phone: [phone] Fax: [phone]    Open 24 Hours?: No      Medication refill instructions:       If your prescription bottle indicates you have medication refills left, it is not necessary to call your provider’s office. Please contact your pharmacy and they will refill your medication.    If your prescription bottle indicates you do not have any refills left, you may request refills at any time through one of the following ways: The online [employer] system (except Urgent Care), by calling your provider’s office, or by asking your pharmacy to contact your provider’s office with a refill request. Medication refills are processed only during regular business hours and may not be available until the next business day. Your provider may request additional information or to have a follow-up visit with you prior to refilling your medication.   *Please Note: Medication refills are assigned a new Rx number when refilled electronically. Your pharmacy may indicate that no refills were authorized even though a new prescription for the same medication is available at the pharmacy. Please request the medicine by name with the pharmacy before contacting your provider for a refill.           [employer] Access Code: [email or web address]  Expires: 8/3/2017  2:02 PM    [employer]  A secure, online tool to manage your health information     [employer]’s [employer]® is a secure, online tool that connects you to your personalized health information from the privacy of your home -- day or night - making  it very easy for you to manage your healthcare. Once the activation process is completed, you can even access your medical information using the [employer] dejah, which is available for free in the Apple Dejah store or Google Play store.     [employer] provides the following levels of access (as shown below):   My Chart Features   [facility] Primary Care Doctor [facility]  Specialists [facility]  Urgent  Care Non-[facility]  Primary Care  Doctor   Email your healthcare team securely and privately 24/7 X X X    Manage appointments: schedule your next appointment; view details of past/upcoming appointments X      Request prescription refills. X      View recent personal medical records, including lab and immunizations X X X X   View health record, including health history, allergies, medications X X X X   Read reports about your outpatient visits, procedures, consult and ER notes X X X X   See your discharge summary, which is a recap of your hospital and/or ER visit that includes your diagnosis, lab results, and care plan. X X       How to register for [employer]:  1. Go to  [email or web address].  2. Click on the Sign Up Now box, which takes you to the New Member Sign Up page. You will need to provide the following information:  a. Enter your [employer] Access Code exactly as it appears at the top of this page. (You will not need to use this code after you’ve completed the sign-up process. If you do not sign up before the expiration date, you must request a new code.)   b. Enter your date of birth.   c. Enter your home email address.   d. Click Submit, and follow the next screen’s instructions.  3. Create a [employer] ID. This will be your [employer] login ID and cannot be changed, so think of one that is secure and easy to remember.  4. Create a [employer] password. You can change your password at any time.  5. Enter your Password Reset Question and Answer. This can be used at a later time if you forget your password.   6. Enter your e-mail address.  This allows you to receive e-mail notifications when new information is available in [email or web address].  7. Click Sign Up. You can now view your health information.    For assistance activating your [email or web address] account, call [phone]

## 2017-07-05 ENCOUNTER — APPOINTMENT (OUTPATIENT)
Dept: RADIOLOGY | Facility: MEDICAL CENTER | Age: 57
DRG: 603 | End: 2017-07-05
Attending: HOSPITALIST
Payer: COMMERCIAL

## 2017-07-05 PROBLEM — Z72.0 TOBACCO ABUSE: Status: ACTIVE | Noted: 2017-07-05

## 2017-07-05 PROBLEM — L29.9 PRURITUS: Status: ACTIVE | Noted: 2017-07-05

## 2017-07-05 PROBLEM — N39.0 UTI (URINARY TRACT INFECTION): Status: ACTIVE | Noted: 2017-07-05

## 2017-07-05 LAB
ANION GAP SERPL CALC-SCNC: 8 MMOL/L (ref 0–11.9)
BASOPHILS # BLD AUTO: 0.8 % (ref 0–1.8)
BASOPHILS # BLD: 0.08 K/UL (ref 0–0.12)
BUN SERPL-MCNC: 16 MG/DL (ref 8–22)
CALCIUM SERPL-MCNC: 8.4 MG/DL (ref 8.5–10.5)
CHLORIDE SERPL-SCNC: 112 MMOL/L (ref 96–112)
CO2 SERPL-SCNC: 23 MMOL/L (ref 20–33)
CREAT SERPL-MCNC: 0.7 MG/DL (ref 0.5–1.4)
CRP SERPL HS-MCNC: 3.37 MG/DL (ref 0–0.75)
EOSINOPHIL # BLD AUTO: 0.57 K/UL (ref 0–0.51)
EOSINOPHIL NFR BLD: 5.6 % (ref 0–6.9)
ERYTHROCYTE [DISTWIDTH] IN BLOOD BY AUTOMATED COUNT: 45.1 FL (ref 35.9–50)
GFR SERPL CREATININE-BSD FRML MDRD: >60 ML/MIN/1.73 M 2
GLUCOSE SERPL-MCNC: 90 MG/DL (ref 65–99)
HCT VFR BLD AUTO: 36 % (ref 37–47)
HGB BLD-MCNC: 12.1 G/DL (ref 12–16)
IMM GRANULOCYTES # BLD AUTO: 0.02 K/UL (ref 0–0.11)
IMM GRANULOCYTES NFR BLD AUTO: 0.2 % (ref 0–0.9)
LYMPHOCYTES # BLD AUTO: 2.84 K/UL (ref 1–4.8)
LYMPHOCYTES NFR BLD: 27.7 % (ref 22–41)
MCH RBC QN AUTO: 31.9 PG (ref 27–33)
MCHC RBC AUTO-ENTMCNC: 33.6 G/DL (ref 33.6–35)
MCV RBC AUTO: 95 FL (ref 81.4–97.8)
MONOCYTES # BLD AUTO: 0.94 K/UL (ref 0–0.85)
MONOCYTES NFR BLD AUTO: 9.2 % (ref 0–13.4)
NEUTROPHILS # BLD AUTO: 5.8 K/UL (ref 2–7.15)
NEUTROPHILS NFR BLD: 56.5 % (ref 44–72)
NRBC # BLD AUTO: 0 K/UL
NRBC BLD AUTO-RTO: 0 /100 WBC
PLATELET # BLD AUTO: 425 K/UL (ref 164–446)
PMV BLD AUTO: 9.6 FL (ref 9–12.9)
POTASSIUM SERPL-SCNC: 3.8 MMOL/L (ref 3.6–5.5)
RBC # BLD AUTO: 3.79 M/UL (ref 4.2–5.4)
SODIUM SERPL-SCNC: 143 MMOL/L (ref 135–145)
WBC # BLD AUTO: 10.3 K/UL (ref 4.8–10.8)

## 2017-07-05 PROCEDURE — 700102 HCHG RX REV CODE 250 W/ 637 OVERRIDE(OP): Performed by: HOSPITALIST

## 2017-07-05 PROCEDURE — 700105 HCHG RX REV CODE 258: Performed by: PHARMACIST

## 2017-07-05 PROCEDURE — 700105 HCHG RX REV CODE 258: Performed by: HOSPITALIST

## 2017-07-05 PROCEDURE — 700105 HCHG RX REV CODE 258: Performed by: INTERNAL MEDICINE

## 2017-07-05 PROCEDURE — 99233 SBSQ HOSP IP/OBS HIGH 50: CPT | Performed by: HOSPITALIST

## 2017-07-05 PROCEDURE — 80048 BASIC METABOLIC PNL TOTAL CA: CPT

## 2017-07-05 PROCEDURE — 700102 HCHG RX REV CODE 250 W/ 637 OVERRIDE(OP): Performed by: INTERNAL MEDICINE

## 2017-07-05 PROCEDURE — 700111 HCHG RX REV CODE 636 W/ 250 OVERRIDE (IP): Performed by: PHARMACIST

## 2017-07-05 PROCEDURE — A9270 NON-COVERED ITEM OR SERVICE: HCPCS | Performed by: INTERNAL MEDICINE

## 2017-07-05 PROCEDURE — 770006 HCHG ROOM/CARE - MED/SURG/GYN SEMI*

## 2017-07-05 PROCEDURE — 700111 HCHG RX REV CODE 636 W/ 250 OVERRIDE (IP): Performed by: INTERNAL MEDICINE

## 2017-07-05 PROCEDURE — 86140 C-REACTIVE PROTEIN: CPT

## 2017-07-05 PROCEDURE — 85025 COMPLETE CBC W/AUTO DIFF WBC: CPT

## 2017-07-05 PROCEDURE — 36415 COLL VENOUS BLD VENIPUNCTURE: CPT

## 2017-07-05 RX ORDER — ACETAMINOPHEN 325 MG/1
650 TABLET ORAL EVERY 6 HOURS PRN
Status: DISCONTINUED | OUTPATIENT
Start: 2017-07-05 | End: 2017-07-06 | Stop reason: HOSPADM

## 2017-07-05 RX ADMIN — ENOXAPARIN SODIUM 40 MG: 100 INJECTION SUBCUTANEOUS at 09:25

## 2017-07-05 RX ADMIN — AMPICILLIN SODIUM AND SULBACTAM SODIUM 3 G: 2; 1 INJECTION, POWDER, FOR SOLUTION INTRAMUSCULAR; INTRAVENOUS at 12:19

## 2017-07-05 RX ADMIN — Medication: at 13:25

## 2017-07-05 RX ADMIN — SODIUM CHLORIDE: 9 INJECTION, SOLUTION INTRAVENOUS at 13:25

## 2017-07-05 RX ADMIN — AMPICILLIN SODIUM AND SULBACTAM SODIUM 3 G: 2; 1 INJECTION, POWDER, FOR SOLUTION INTRAMUSCULAR; INTRAVENOUS at 23:30

## 2017-07-05 RX ADMIN — LORATADINE 10 MG: 10 TABLET ORAL at 09:24

## 2017-07-05 RX ADMIN — Medication: at 22:03

## 2017-07-05 RX ADMIN — IBUPROFEN 600 MG: 600 TABLET, FILM COATED ORAL at 22:02

## 2017-07-05 RX ADMIN — MULTIPLE VITAMINS W/ MINERALS TAB 1 TABLET: TAB at 09:24

## 2017-07-05 RX ADMIN — STANDARDIZED SENNA CONCENTRATE AND DOCUSATE SODIUM 2 TABLET: 8.6; 5 TABLET, FILM COATED ORAL at 22:02

## 2017-07-05 RX ADMIN — AMPICILLIN SODIUM AND SULBACTAM SODIUM 3 G: 2; 1 INJECTION, POWDER, FOR SOLUTION INTRAMUSCULAR; INTRAVENOUS at 17:58

## 2017-07-05 RX ADMIN — VANCOMYCIN HYDROCHLORIDE 1000 MG: 100 INJECTION, POWDER, LYOPHILIZED, FOR SOLUTION INTRAVENOUS at 04:40

## 2017-07-05 RX ADMIN — IBUPROFEN 600 MG: 600 TABLET, FILM COATED ORAL at 12:18

## 2017-07-05 RX ADMIN — AMPICILLIN SODIUM AND SULBACTAM SODIUM 3 G: 2; 1 INJECTION, POWDER, FOR SOLUTION INTRAMUSCULAR; INTRAVENOUS at 06:35

## 2017-07-05 ASSESSMENT — LIFESTYLE VARIABLES: DO YOU DRINK ALCOHOL: NO

## 2017-07-05 ASSESSMENT — PAIN SCALES - GENERAL
PAINLEVEL_OUTOF10: 7
PAINLEVEL_OUTOF10: 3
PAINLEVEL_OUTOF10: 7
PAINLEVEL_OUTOF10: 6
PAINLEVEL_OUTOF10: 7

## 2017-07-05 ASSESSMENT — ENCOUNTER SYMPTOMS
CHILLS: 0
VOMITING: 0
FEVER: 0
NAUSEA: 0

## 2017-07-05 NOTE — PROGRESS NOTES
Patient arrived to the floor at 1950 via transport on the [facility]. Patient is A&Ox4, patient is able to ambulate with steady gait to bed. Patient has bilateral redness and swelling with scabbing to both lower extremities. Patient stated that this has been an ongoing issue but has been getting increasingly worse over the last few weeks. Assessment and admit profile complete.

## 2017-07-05 NOTE — H&P
HOSPITAL MEDICINE ADMISSION H & P    Name: Brittaney Kelley  MRN: [id number]  : 1960  Admit Date: 2017  Admitting Provider: Amol Crow M.D.  Primary Care Physician: Chirag Kilgore D.O.      CHIEF COMPLAINT: Bilateral legs redness, pain, and swelling    HISTORY OF PRESENT ILLNESS: This is a 56-year-old female without any past medical history. Patient states that for years now, she's had issues with relentless leg itching which she scratches continuously. The itching got worse when her sister passed away 4 years ago. In the last few months starting in April, she started to notice redness and swelling of her bilateral lower legs, with associated pain which she described a sharp, worse with pressure on the legs, and walking/ambulation. She went to the urgent care Center once, and she was given prednisone and unrecalled antibiotics which transiently improved her symptoms, but the redness and swelling of the legs recurred after her course of prednisone was completed. Since then, the redness, pain, and itching progressively worsened which prompted her to go to the urgent care center again who then directed her to the ED.    Additionally, she denied any other symptoms such as fevers, chills, nausea, vomiting, abdominal pain, chest pain, or shortness of breath. Also, she states that she tried a lot of medications including topicals for her itching but none helped so far.    ED COURSE: The patient was initially evaluated in the emergency department, was maintaining good hemodynamics, saturating well on room air, and was afebrile. Initial blood work was remarkable for WBC count of 11,400 without any left shift or bandemia, with unremarkable BMP and liver motion test. CRP was 4.14, with ESR 47. Urinalysis was unimpressive. Lower extremity duplex ultrasound was done which did not show any DVT. Patient was given Unasyn and vancomycin. She was subsequently admitted to the hospitalist service for  further evaluation and management.    REVIEW OF SYSTEMS  A complete review of system was done. All other systems were negative.    PMH/PSH/FMH: I personally reviewed all ancillary histories as noted.    PAST MEDICAL HISTORY:  None    PAST SURGICAL HISTORY:  None    PERSONAL/SOCIAL HISTORY:  Social History   Substance Use Topics   • Smoking status: Former Smoker   • Smokeless tobacco: Not on file   • Alcohol Use: Yes       FAMILY MEDICAL HISTORY:  Family History   Problem Relation Age of Onset   • Stroke Neg Hx    • Heart Disease Neg Hx        ALLERGIES:  Review of patient's allergies indicates no known allergies.    HOME MEDICATIONS:  Prior to Admission medications    Medication Sig Start Date End Date Taking? Authorizing Provider   therapeutic multivitamin-minerals (THERAGRAN-M) Tab Take 1 Tab by mouth every day.   Yes Not In System Provider   acetaminophen (TYLENOL) 325 MG Tab Take 650 mg by mouth every 6 hours as needed for Mild Pain.   Yes Not In System Provider   ibuprofen (MOTRIN) 200 MG Tab Take 600 mg by mouth every 6 hours as needed for Mild Pain or Inflammation.    Not In System Provider   diphenhydrAMINE (BENADRYL) 25 MG Tab Take 25 mg by mouth every 6 hours as needed for Itching.    Not In System Provider           PHYSICAL EXAMINATION:  VITALS: Blood pressure 114/81, heart rate 76, respiratory rate 16, oxygen saturation 96% on room air, temperature 37.2°C.  CONSTITUTIONAL: (-) diaphoresis, (-) distress  HENT: Normocephalic, atraumatic, (-) tonsillopharyngal congestion or exudate.  EYES: PERRLA, pink conjuctivae, (-) icteric sclerae  NECK: (-) cervical lymphadenopathy, (-) neck rigidity  CARDIOVASCULAR: Distinct heart sounds, RRR, (-) murmurs, rubs, gallops, (-) LE edema  RESPIRATORY: Equal chest expansion, clear breath sounds, (-) crackles/wheezes/rales/rhonchi  GASTROINTESTINAL: normoactive bowel sounds, soft, (-) tenderness, (-) masses, (-) guarding/rebound  MUSCULOSKELETAL: (-) joint  swelling/tenderness, (-) joint deformities, (-) muscle tenderness,   (-) gross limitation of movement of 4 extremities  SKIN: (+) Erythema, warmth, and tenderness on the bilateral lower legs, with excoriation and superficial skin erosions, and scratch marks. PSYCHIATRIC: mood, affect, and thought content WNL, behavior age appropriate  NEUROLOGIC: Non-focal, moves all 4 extremities, sensory grossly intact      PERTINENT DIAGNOSTIC RESULTS:  Reviewed, and as mentioned above. Please refer to ED course.    ASSESSMENTS:  1. Cellulitis of the bilateral legs    PLANS:  --- I will admit her to the medical unit. I anticipate that she will need at least 2 midnights hospital stay to provide medically necessary services.  --- I will continue her on IV antibiotics for her cellulitis. I will start her Unasyn IV, and given portal of entry with scratches, I will start her on IV vancomycin. We will get a swab of her skin excoriations, and sent for Gram stain and culture. I will also send for MRSA nasal swab. We will trend her CRP. I will get one service to consult for wound care.  --- I will start her on IV Toradol, oral oxycodone, oral motrin, and IV morphine for pain. I will also continue her on as needed IV Benadryl, and scheduled oral Claritin for itching.      DVT PROPHYLAXIS -Lovenox subcutaneously  GI PROPHYLAXIS -not indicated  CODE STATUS -full code

## 2017-07-05 NOTE — PROGRESS NOTES
Chief Complaint   Patient presents with   • Rash     Bilateral redness, swelling, itching and pain of lower legs for several months. Previous tx not helping       HISTORY OF PRESENT ILLNESS: Patient is a 56 y.o. female who presents today for acutely worsening bilateral lower leg redness, edema and itching.  Patient has history or itching however never this badly and now she reports pain and worsening swelling.   Patient states this happened in May and was given abx and steroids.   At the time the itching had just started on her left leg and spread to the right.  She states the there was some mild improvement on the steroids.  It has gotten to this point in the last week or so.  No hx of immune compromise or DM.  No other attempted interventions.  No fevers    Patient Active Problem List    Diagnosis Date Noted   • Bilateral cellulitis of lower leg 07/04/2017       Allergies:Review of patient's allergies indicates no known allergies.    Current Facility-Administered Medications Ordered in Epic   Medication Dose Route Frequency Provider Last Rate Last Dose   • vancomycin 1,000 mg in  mL IVPB  15 mg/kg Intravenous Q12HR Juan Alcaraz, PHARMD   Stopped at 07/05/17 0640   • acetaminophen (TYLENOL) tablet 650 mg  650 mg Oral Q6HRS PRN Anuel Lara M.D.       • NS infusion   Intravenous Continuous Anuel Lara M.D. 125 mL/hr at 07/04/17 1307     • ampicillin/sulbactam (UNASYN) 3 g in  mL IVPB  3 g Intravenous Q6HRS Amol Crow M.D.   Stopped at 07/05/17 0705   • MD ALERT... vancomycin per pharmacy protocol   Other pharmacy to dose Amol Crow M.D.       • loratadine (CLARITIN) tablet 10 mg  10 mg Oral DAILY Amol Crow M.D.   10 mg at 07/04/17 2011   • ibuprofen (MOTRIN) tablet 600 mg  600 mg Oral Q6HRS PRN Amol Crow M.D.       • therapeutic multivitamin-minerals (THERAGRAN-M) tablet 1 Tab  1 Tab Oral DAILY Amol Crow M.D.       • senna-docusate (PERICOLACE or SENOKOT S) 8.6-50  "MG per tablet 2 Tab  2 Tab Oral BID Amol Crow M.D.   Stopped at 07/04/17 2100    And   • polyethylene glycol/lytes (MIRALAX) PACKET 1 Packet  1 Packet Oral QDAY PRN Amol Crow M.D.        And   • magnesium hydroxide (MILK OF MAGNESIA) suspension 30 mL  30 mL Oral QDAY PRN Amol Crow M.D.        And   • bisacodyl (DULCOLAX) suppository 10 mg  10 mg Rectal QDAY PRN Amol Crow M.D.       • enoxaparin (LOVENOX) inj 40 mg  40 mg Subcutaneous DAILY Amol Crow M.D.         No current Epic-ordered outpatient prescriptions on file.       No past medical history on file.    Social History   Substance Use Topics   • Smoking status: Former Smoker   • Smokeless tobacco: Not on file   • Alcohol Use: Yes       No family status information on file.     Family History   Problem Relation Age of Onset   • Stroke Neg Hx    • Heart Disease Neg Hx        ROS:  Review of Systems   Constitutional: Negative for fever, chills, weight loss and malaise/fatigue.   HENT: Negative for ear pain, nosebleeds, congestion, sore throat and neck pain.    Eyes: Negative for blurred vision.   Respiratory: Negative for cough, sputum production, shortness of breath and wheezing.    Cardiovascular: Negative for chest pain, palpitations, orthopnea and leg swelling.   Gastrointestinal: Negative for heartburn, nausea, vomiting and abdominal pain.   Skin: SEE HPI    Exam:  Blood pressure 108/60, pulse 74, temperature 37.2 °C (99 °F), height 1.829 m (6' 0.01\"), weight 68.493 kg (151 lb), SpO2 96 %.  General:  Well nourished, well developed female in NAD  Eyes: PERRLA, EOM within normal limits, no conjunctival injection, no scleral icterus, visual fields and acuity grossly intact.  Mouth: reasonable hygiene, no erythema exudates or tonsillar enlargement.  Neck: no masses, range of motion within normal limits, no tracheal deviation. No lymphadenopathy  Pulmonary: Normal respiratory effort, no wheezes, crackles, or rhonchi.  Cardiovascular: " regular rate and rhythm without murmurs, rubs, or gallops.  Skin: Warm, pink, dry.  Bilateral lower legs with extensive sores/excoriations/erythema to bilateral knees and 1+ edema right, 2+ left with ecchymotic discoloration around her left ankle.  2+ pedal and PT pulses with normal sensation.   Extremities: no clubbing, cyanosis, or edema.  Neuro: A&O x 3. Speech normal/clear.  Normal gait.         Assessment/Plan:  1. Bilateral cellulitis of lower leg           -severity of presentation necessitates stabilization in a hospital setting.  She has greater edema on the left than right.  Possible she will be ordered dopplers, labs, etc.   -discussed this with patient and she agrees to go to ED for further work up.   -discussed that despite being seen here first and call placed to ED this does not necessarily expedite her process and she expresses understanding.       Enedina Holloway PA-C

## 2017-07-05 NOTE — CARE PLAN
Problem: Infection  Goal: Will remain free from infection  Outcome: PROGRESSING AS EXPECTED  Pt has a confirmed infection.  Treating with ABX as per MAR    Problem: Venous Thromboembolism (VTW)/Deep Vein Thrombosis (DVT) Prevention:  Goal: Patient will participate in Venous Thrombosis (VTE)/Deep Vein Thrombosis (DVT)Prevention Measures  Outcome: PROGRESSING AS EXPECTED  Pt being anticoagulated with Lovenox as per MAR

## 2017-07-05 NOTE — PROGRESS NOTES
Pt IV infiltrated.  This RN tried once, second RN tried twice.   Pt is a hard stick.   Pt now refusing IV, paged Dr. Lara to see what he would like to do.

## 2017-07-05 NOTE — ED NOTES
Pt to floor with hospital transport.  Patient has chart and all belongings.  Floor notified. Vitals up to date.

## 2017-07-05 NOTE — CARE PLAN
Problem: Infection  Goal: Will remain free from infection  Patient is on IV antibiotics for cellulitis in bilateral lower extremities.    Problem: Knowledge Deficit  Goal: Knowledge of disease process/condition, treatment plan, diagnostic tests, and medications will improve  Patient educated that she will most likely need a few days of IV antibiotics. Patient also informed that a RN specializing in wound care will come to see her to look at her legs to see if she needs specialized wound care.

## 2017-07-05 NOTE — PROGRESS NOTES
Assumed care of patient @ 0700, report received at bedside,  assessment done, labs and orders noted.  Pt A & Ox4, PIV running maintenance fluids as per MAR.  Pt is resting comfortably in bed, no signs or symptoms of distress.  Pt reports pain is a 0/10.  Pt has been updated on the plan of care.    Bed alarm is off, bed is in lowest position, fall risk socks in place, call light within reach. Pt verbalized all needs are met at this time.  Pt did want to go smoke when her  gets here.  RN discussed with pt that this a non-smoking campus and that leaving the campus to smoke is effectively leaving AMA.  Pt verbalized understanding.  Nicotine patch was offered, pt refused.

## 2017-07-05 NOTE — ED NOTES
Received report from DIANA Enamorado.  Pt updated on POC, call light in place, gurney in low/locked position.

## 2017-07-05 NOTE — ED NOTES
Med rec updated and complete.  Allergies reviewed.  Met with pt at bedside .  Pt had some leftover Azithromycin and started taking it a couple   Of days ago.  Pt stated she thinks that she took about 3 tablets.    No current prescription medications.

## 2017-07-05 NOTE — PROGRESS NOTES
Pharmacy Kinetics 56 y.o. female on vancomycin day # 2 2017    Indication for Treatment: SSTI    Pertinent history per medical record: Admitted on 2017 for bilateral lower extremity redness, pain and swelling. Diagnosed as cellulitis, empiric antibiotic therapy initiated.    Other antibiotics: Ampicillin/sulbactam 3 g IV q6h    Allergies: Review of patient's allergies indicates no known allergies.     List concerns for renal function: Elevated BUN/SCr ratio     Pertinent cultures to date:     17 Wound (left leg)     NGTD  17 Nares       Negative for MRSA      Recent Labs      17   1506  17   0023   WBC  11.4*  10.3   NEUTSPOLYS  56.90  56.50     Recent Labs      17   1506  17   0022   BUN  21  16   CREATININE  0.96  0.70   ALBUMIN  3.9   --      Blood pressure 135/63, pulse 68, temperature 36.2 °C (97.2 °F), resp. rate 16, height 1.829 m (6'), weight 64.7 kg (142 lb 10.2 oz), SpO2 97 %, not currently breastfeeding. Temp (24hrs), Av.7 °C (98.1 °F), Min:36.2 °C (97.2 °F), Max:37.2 °C (99 °F)      A/P   1. Vancomycin dose change: Give 1700 mg IV loading dose followed by 1000 mg IV q12h scheduled dosing  2. Next vancomycin level: Trough in ~2 days (not ordered)  3. Goal trough: 12-16 mcg/mL  4. Comments: Therapy with vancomycin initiated empirically for SSTI. With minimal concern for accumulation, will provide loading dose and start scheduled dosing thereafter as outlined above. Will plan to check trough level when patient nearing steady state of dosing in order to ensure appropriate clearance and drug levels. Recommend de-escalation if MRSA can be ruled out.  Pharmacy will continue to follow.     Dain StevenD, BCPS

## 2017-07-05 NOTE — PROGRESS NOTES
[facility] Hospitalist Progress Note    Date of Service: 2017    Chief Complaint  56 y.o. female admitted 2017 with bilateral LE cellulitis.    Interval Problem Update  Feeling better.  Has sig itching causing her to scratch constantly.  This leg issue has been on going.  She was treated c Abx and steroid before.    Consultants/Specialty      Disposition          Review of Systems   Constitutional: Negative for fever and chills.   Gastrointestinal: Negative for nausea and vomiting.   Skin: Positive for itching and rash.   All other systems reviewed and are negative.     Physical Exam  Laboratory/Imaging   Hemodynamics  Temp (24hrs), Av.8 °C (98.2 °F), Min:36.2 °C (97.2 °F), Max:37.2 °C (99 °F)   Temperature: 37 °C (98.6 °F)  Pulse  Av.2  Min: 65  Max: 101    Blood Pressure: 113/72 mmHg, NIBP: 120/74 mmHg      Respiratory      Respiration: 18, Pulse Oximetry: 96 %             Fluids  No intake or output data in the 24 hours ending 17 1336    Nutrition  Orders Placed This Encounter   Procedures   • Diet Order     Standing Status: Standing      Number of Occurrences: 1      Standing Expiration Date:      Order Specific Question:  Diet:     Answer:  Regular [1]     Physical Exam  Nursing note and vitals reviewed.  Constitutional: She is oriented to person, place, and time. She appears well-developed and well-nourished.   HENT:   Head: Normocephalic and atraumatic.   Right Ear: External ear normal.   Left Ear: External ear normal.   Nose: Nose normal.   Mouth/Throat: Oropharynx is small with Mallanpati score of 2-3.  Mucosa is clear and moist.   Eyes: Conjunctivae and extraocular motions are normal. Pupils are equal, round, and reactive to light.   Neck: Normal range of motion. Neck supple.   Cardiovascular: Normal rate, regular rhythm, normal heart sounds and intact distal pulses. +4LLE and +2 RLEE.    Pulmonary/Chest: Effort normal and breath sounds normal.   Abdominal: Soft. Bowel sounds are normal.    Musculoskeletal: Normal range of motion.   Neurological: She is alert and oriented to person, place, and time.   Skin: Skin is warm and dry.  BLE multiple scabs and erythema.    Recent Labs      07/04/17   1506  07/05/17   0023   WBC  11.4*  10.3   RBC  4.20  3.79*   HEMOGLOBIN  13.6  12.1   HEMATOCRIT  40.1  36.0*   MCV  95.5  95.0   MCH  32.4  31.9   MCHC  33.9  33.6   RDW  45.8  45.1   PLATELETCT  434  425   MPV  9.8  9.6     Recent Labs      07/04/17   1506  07/05/17   0022   SODIUM  139  143   POTASSIUM  3.9  3.8   CHLORIDE  106  112   CO2  23  23   GLUCOSE  89  90   BUN  21  16   CREATININE  0.96  0.70   CALCIUM  9.3  8.4*     Recent Labs      07/04/17   1506   APTT  29.9   INR  0.93                  Assessment/Plan     Bilateral cellulitis of lower leg  Assessment & Plan  Cont Unasyn.  DC vancomycin.    UTI (urinary tract infection)  Assessment & Plan  Cont Abx and follow UCX.    Tobacco abuse  Assessment & Plan  Cessation ed.  Nicoderm.    Pruritus  Assessment & Plan  Start Benadryl cream.       Medications reviewed, Labs reviewed and Radiology images reviewed  Ly catheter: No Ly      DVT Prophylaxis: Enoxaparin (Lovenox)    Ulcer prophylaxis: Not indicated  Antibiotics: Treating active infection/contamination beyond 24 hours perioperative coverage  Assessed for rehab: Patient returned to prior level of function, rehabilitation not indicated at this time        Preventives - IS, Vax, stool soft, DVTP.    Dispo - complex/guarded.

## 2017-07-06 ENCOUNTER — PATIENT OUTREACH (OUTPATIENT)
Dept: HEALTH INFORMATION MANAGEMENT | Facility: OTHER | Age: 57
End: 2017-07-06

## 2017-07-06 VITALS
BODY MASS INDEX: 19.32 KG/M2 | TEMPERATURE: 97.5 F | DIASTOLIC BLOOD PRESSURE: 79 MMHG | HEART RATE: 71 BPM | HEIGHT: 72 IN | WEIGHT: 142.64 LBS | RESPIRATION RATE: 16 BRPM | SYSTOLIC BLOOD PRESSURE: 128 MMHG | OXYGEN SATURATION: 96 %

## 2017-07-06 PROCEDURE — 700102 HCHG RX REV CODE 250 W/ 637 OVERRIDE(OP): Performed by: INTERNAL MEDICINE

## 2017-07-06 PROCEDURE — 87086 URINE CULTURE/COLONY COUNT: CPT

## 2017-07-06 PROCEDURE — 700105 HCHG RX REV CODE 258: Performed by: INTERNAL MEDICINE

## 2017-07-06 PROCEDURE — 700111 HCHG RX REV CODE 636 W/ 250 OVERRIDE (IP): Performed by: INTERNAL MEDICINE

## 2017-07-06 PROCEDURE — A9270 NON-COVERED ITEM OR SERVICE: HCPCS | Performed by: INTERNAL MEDICINE

## 2017-07-06 PROCEDURE — 99239 HOSP IP/OBS DSCHRG MGMT >30: CPT | Performed by: HOSPITALIST

## 2017-07-06 RX ORDER — WITCH HAZEL 50 %
2 PADS, MEDICATED (EA) TOPICAL
COMMUNITY
Start: 2017-07-06

## 2017-07-06 RX ORDER — HYDROXYZINE HYDROCHLORIDE 25 MG/1
25 TABLET, FILM COATED ORAL 3 TIMES DAILY PRN
Qty: 30 TAB | Refills: 1 | Status: SHIPPED | OUTPATIENT
Start: 2017-07-06

## 2017-07-06 RX ORDER — ACETAMINOPHEN 325 MG/1
650 TABLET ORAL EVERY 6 HOURS PRN
Qty: 30 TAB | Refills: 0 | COMMUNITY
Start: 2017-07-06

## 2017-07-06 RX ORDER — AMOXICILLIN AND CLAVULANATE POTASSIUM 875; 125 MG/1; MG/1
1 TABLET, FILM COATED ORAL 2 TIMES DAILY
Qty: 20 TAB | Refills: 0 | Status: SHIPPED | OUTPATIENT
Start: 2017-07-06

## 2017-07-06 RX ADMIN — AMPICILLIN SODIUM AND SULBACTAM SODIUM 3 G: 2; 1 INJECTION, POWDER, FOR SOLUTION INTRAMUSCULAR; INTRAVENOUS at 12:46

## 2017-07-06 RX ADMIN — AMPICILLIN SODIUM AND SULBACTAM SODIUM 3 G: 2; 1 INJECTION, POWDER, FOR SOLUTION INTRAMUSCULAR; INTRAVENOUS at 06:01

## 2017-07-06 RX ADMIN — LORATADINE 10 MG: 10 TABLET ORAL at 09:00

## 2017-07-06 RX ADMIN — MULTIPLE VITAMINS W/ MINERALS TAB 1 TABLET: TAB at 09:00

## 2017-07-06 RX ADMIN — Medication: at 09:00

## 2017-07-06 ASSESSMENT — PAIN SCALES - GENERAL
PAINLEVEL_OUTOF10: 0
PAINLEVEL_OUTOF10: 2

## 2017-07-06 ASSESSMENT — LIFESTYLE VARIABLES: EVER_SMOKED: YES

## 2017-07-06 NOTE — DISCHARGE INSTRUCTIONS
Discharge Instructions    Discharged to home by car with self. Discharged via walking, hospital escort: Yes.  Special equipment needed: Not Applicable    Be sure to schedule a follow-up appointment with your primary care doctor or any specialists as instructed.     Discharge Plan:   Diet Plan: Discussed  Activity Level: Discussed  Smoking Cessation Offered: Patient Refused  Confirmed Follow up Appointment: Patient to Call and Schedule Appointment  Confirmed Symptoms Management: Discussed  Medication Reconciliation Updated: Yes  Influenza Vaccine Indication: Patient Refuses    I understand that a diet low in cholesterol, fat, and sodium is recommended for good health. Unless I have been given specific instructions below for another diet, I accept this instruction as my diet prescription.   Other diet: regular      Special Instructions: None    · Is patient discharged on Warfarin / Coumadin?   No     · Is patient Post Blood Transfusion?  No    Cellulitis  Cellulitis is an infection of the skin and the tissue under the skin. The infected area is usually red and tender. This happens most often in the arms and lower legs.  HOME CARE   · Take your antibiotic medicine as told. Finish the medicine even if you start to feel better.  · Keep the infected arm or leg raised (elevated).  · Put a warm cloth on the area up to 4 times per day.  · Only take medicines as told by your doctor.  · Keep all doctor visits as told.  GET HELP IF:  · You see red streaks on the skin coming from the infected area.  · Your red area gets bigger or turns a dark color.  · Your bone or joint under the infected area is painful after the skin heals.  · Your infection comes back in the same area or different area.  · You have a puffy (swollen) bump in the infected area.  · You have new symptoms.  · You have a fever.  GET HELP RIGHT AWAY IF:   · You feel very sleepy.  · You throw up (vomit) or have watery poop (diarrhea).  · You feel sick and have  muscle aches and pains.  MAKE SURE YOU:   · Understand these instructions.  · Will watch your condition.  · Will get help right away if you are not doing well or get worse.     This information is not intended to replace advice given to you by your health care provider. Make sure you discuss any questions you have with your health care provider.     Document Released: 06/05/2009 Document Revised: 01/08/2016 Document Reviewed: 03/04/2013  [employer] Interactive Patient Education ©2016 [employer]      Depression / Suicide Risk    As you are discharged from this [facility] facility, it is important to learn how to keep safe from harming yourself.    Recognize the warning signs:  · Abrupt changes in personality, positive or negative- including increase in energy   · Giving away possessions  · Change in eating patterns- significant weight changes-  positive or negative  · Change in sleeping patterns- unable to sleep or sleeping all the time   · Unwillingness or inability to communicate  · Depression  · Unusual sadness, discouragement and loneliness  · Talk of wanting to die  · Neglect of personal appearance   · Rebelliousness- reckless behavior  · Withdrawal from people/activities they love  · Confusion- inability to concentrate     If you or a loved one observes any of these behaviors or has concerns about self-harm, here's what you can do:  · Talk about it- your feelings and reasons for harming yourself  · Remove any means that you might use to hurt yourself (examples: pills, rope, extension cords, firearm)  · Get professional help from the community (Mental Health, Substance Abuse, psychological counseling)  · Do not be alone:Call your Safe Contact- someone whom you trust who will be there for you.  · Call your local CRISIS HOTLINE 205-6951 or 816-041-2879  · Call your local Children's Mobile Crisis Response Team [place] (979) 947-6174 or www.[employer]  · Call the toll free National Suicide Prevention  Hotlines   · National Suicide Prevention Lifeline 012-107-YEVT (1541)  · National Hope Line Network 800-SUICIDE (088-5608)

## 2017-07-06 NOTE — PROGRESS NOTES
"Assumed care of patient @ 0700, report received at bedside,  assessment done, labs and orders noted.  Pt A & Ox4, PIV running maintenance fluids as per MAR.  Pt is resting comfortably in bed, no signs or symptoms of distress.  Pt reports pain is a 0/10, declines intervention. Pt leg swelling is minimal in comparison to the pitting edema (2+.,3+ yesterday).   Pt has been updated on the plan of care, but is somewhat insistent on going home today stating \"I can do everything you guys are doin' at home\".   Urine sample has been collected this morning (reason ALOC, but this RN has not experienced any altered mentation for this patient whatsoever) Bed alarm is off, pt has refused, bed is in lowest position, fall risk socks in place, call light within reach. Pt verbalized all needs are met at this time.  "

## 2017-07-06 NOTE — WOUND TEAM
"[facility]  Inpatient Services  Initial Wound & Skin Care Evaluation    Admission Date:  7/4/2017   HPI, PMH, SH: Reviewed  Unit where seen by Wound Team: [id number]    WOUND CONSULT RELATED TO: BLE    SUBJECTIVE:  \"I've had it for 4 years. Sometimes they get better, but I always itch.\"    Self Report / Pain Level: c/o discomfort with cleaning      OBJECTIVE: pt with sweat pants on, dry skin and crust to inside of pants.   WOUND TYPE, LOCATION, CHARACTERISTICS (Pressure ulcers: location, stage, POA or date identified)  Wound Abrasions/excoriations Both Leg  Lower  Periwound Skin: Red, Warm  Drainage : Scant, Serous:       Tissue Type and %:    100% red  Wound Edges:    attached    Odor:     None   Exposed structure(s):   No   Signs and Symptoms of Infection: Edema and Erythema    Measurements: taken 7/5/17    Leg  Lower  Length (cm): 39 (left 50)  Width (cm): 19 (left 35)  Depth (cm): 0.3     Tracts/undermining:  None     INTERVENTIONS BY WOUND TEAM: assessed BLE, cleaned wounds with NS. Left SHANDRA. Tried to get pt to let staff remove pants so that legs wouldn't get dry crust that won't stick to clothes. Pt refused a cover drsg.   Wound Abrasion Leg  Lower-Dressing Options: Open to Air    Interdisciplinary consultation:   With Nursing;With Patient     EVALUATION:pt has multiple partial thickness wounds to BLE (L>R). Scratch marks present, crust present. Edema, erythema and pruritis present. Discussion with pt about topical drsg, \"No it gets too hot then I scratch more.\" Recommended to RN talk with MD about a topical steroid and/or antihistamine. There is scant serous drainage from some of the wounds, if pt can tolerate possibly applying cream as ordered then apply adaptic then wrap with roll gauze.     Factors affecting wound healing: chronic condition x4 years, infection, pruritis  Goals: decreased wound size 1% each week      NURSING PLAN OF CARE ORDERS (X):    Dressing changes: See Dressing Maintenance " orders:   Skin care: See Skin Care orders:   Rectal tube care: See Rectal Tube Care orders:   Other orders: wounds SHANDRA, benadryl cream ordered    RSKIN: CURRENT (X) ORDERED (O)  Pt performs self care  Q shift Kimo:  X  Q shift pressure point assessments:  X  Pressure redistribution mattress   x     SARWAT      Bariatric SARWAT      Bariatric foam        Heel float boots       Heels floated on pillows      Barrier wipes      Barrier Cream      Barrier paste      Sacral silicone dressing      Padded O2 tubing      Anchorfast      Trach with Optifoam split foam       Waffle cushion      Rectal tube or BMS      Antifungal tx    Turn q 2 hours pt performs  Up to chair  Ambulate   PT/OT     Dietician      PO   x  TF   TPN     PVN    NPO   # days   Other       WOUND TEAM PLAN OF CARE (X):   NPWT change 3 x week:        Dressing changes by wound team:       Follow up as needed:  x     Other (explain):    Anticipated discharge plans (X):  SNF:           Home Care:           Outpatient Wound Center:            Self Care:            Other:  tbd

## 2017-07-06 NOTE — CARE PLAN
Problem: Communication  Goal: The ability to communicate needs accurately and effectively will improve  Outcome: PROGRESSING AS EXPECTED  Pt capable of verbalizing all needs and utilizes call light system approprietly.    Problem: Safety  Goal: Will remain free from falls  Outcome: PROGRESSING AS EXPECTED  Pt wearing treaded socks, bed locked and in lowest position, bed alarm is not indicated for this pt.  Pt call light and phone within reach.

## 2017-07-06 NOTE — PROGRESS NOTES
Assumed care of pt, received report from day shift RN, pt assessed.  Pt complaining of 6/10 bilateral lower leg pain, pt medicated per MAR.  Pt is A&O x4.  Pt wearing treaded socks, bed locked and in lowest position, bed alarm is not indicated for this pt.  Pt instructed to call for assistance prior to getting OOB, pt verbalized understanding.  Call light, phone, and personal belongings within reach.

## 2017-07-06 NOTE — DISCHARGE SUMMARY
CHIEF COMPLAINT ON ADMISSION  Chief Complaint   Patient presents with   • Open Wound   • Leg Swelling       CODE STATUS  Full Code    HPI & HOSPITAL COURSE  This is a 56-year-old female without any past medical history. Patient states that for years now, she's had issues with relentless leg itching which she scratches continuously. The itching got worse when her sister passed away 4 years ago. In the last few months starting in April, she started to notice redness and swelling of her bilateral lower legs, with associated pain which she described a sharp, worse with pressure on the legs, and walking/ambulation. She went to the urgent care Center once, and she was given prednisone and unrecalled antibiotics which transiently improved her symptoms, but the redness and swelling of the legs recurred after her course of prednisone was completed. Since then, the redness, pain, and itching progressively worsened which prompted her to go to the urgent care center again who then directed her to the ED.    Additionally, she denied any other symptoms such as fevers, chills, nausea, vomiting, abdominal pain, chest pain, or shortness of breath. Also, she states that she tried a lot of medications including topicals for her itching but none helped so far.    The patient was initially evaluated in the emergency department, was maintaining good hemodynamics, saturating well on room air, and was afebrile. Initial blood work was remarkable for WBC count of 11,400 without any left shift or bandemia, with unremarkable BMP and liver motion test. CRP was 4.14, with ESR 47. Urinalysis was unimpressive. Lower extremity duplex ultrasound was done which did not show any DVT. Patient was given Unasyn and vancomycin. She was subsequently admitted to the hospitalist service for further evaluation and management.    Upon admit Unasyn was started.  Benadryl cream and PO med's were started to minimize itching.  With leg elevation the edema in the  legs decrease significantly.  Pt was encouraged to keep the legs elevated when possible.  With the decreased erythema and edema Unasyn was changed to Augmentin.  Pt was also encouraged to stop smoking.  With the above measures pt's symptoms improved notably.  At the time of this summary the patient was eating and drinking well, having good BM's and was hemodynamically stable.      DISCHARGE PROBLEM LIST  Active Problems:    Bilateral cellulitis of lower leg POA: Unknown    UTI (urinary tract infection) POA: Unknown    Tobacco abuse POA: Unknown    Pruritus POA: Unknown  Resolved Problems:    * No resolved hospital problems. *      FOLLOW UP  Dr. NAIN Kilgore (PCP) 2-3 wks.   No follow-up provider specified.    MEDICATIONS ON DISCHARGE   Brittaney Kelley   Home Medication Instructions [id number]    Printed on:07/06/17 1044   Medication Information                      acetaminophen (TYLENOL) 325 MG Tab  Take 2 Tabs by mouth every 6 hours as needed for Fever or Moderate Pain (Temp >101.5F).             acidophilus/bulgaricus (LACTINEX) Tab tablet  Take 2 Tabs by mouth 3 times a day, with meals.             amoxicillin-clavulanate (AUGMENTIN) 875-125 MG Tab  Take 1 Tab by mouth 2 times a day.             diphenhydrAMINE (BENADRYL) 25 MG Tab  Take 25 mg by mouth every 6 hours as needed for Itching.             diphenhydramine-ZnAcetate (BENADRYL ITCH) 1-0.1 % Cream               hydrOXYzine (ATARAX) 25 MG Tab  Take 1 Tab by mouth 3 times a day as needed for Itching.             ibuprofen (MOTRIN) 200 MG Tab  Take 600 mg by mouth every 6 hours as needed for Mild Pain or Inflammation.             therapeutic multivitamin-minerals (THERAGRAN-M) Tab  Take 1 Tab by mouth every day.                 DIET  Orders Placed This Encounter   Procedures   • Diet Order     Standing Status: Standing      Number of Occurrences: 1      Standing Expiration Date:      Order Specific Question:  Diet:     Answer:  Regular [1]        ACTIVITY  Gradual increase in activity.  No tobacco.    PROCEDURES  Bilateral LE ultrasound    LABORATORY  Lab Results   Component Value Date/Time    SODIUM 143 07/05/2017 12:22 AM    POTASSIUM 3.8 07/05/2017 12:22 AM    CHLORIDE 112 07/05/2017 12:22 AM    CO2 23 07/05/2017 12:22 AM    GLUCOSE 90 07/05/2017 12:22 AM    BUN 16 07/05/2017 12:22 AM    CREATININE 0.70 07/05/2017 12:22 AM        Lab Results   Component Value Date/Time    WBC 10.3 07/05/2017 12:23 AM    HEMOGLOBIN 12.1 07/05/2017 12:23 AM    HEMATOCRIT 36.0* 07/05/2017 12:23 AM    PLATELET COUNT 425 07/05/2017 12:23 AM        Total time of the discharge process exceeds 37 min.

## 2017-07-07 LAB
BACTERIA WND AEROBE CULT: ABNORMAL
BACTERIA WND AEROBE CULT: ABNORMAL
GRAM STN SPEC: ABNORMAL
SIGNIFICANT IND 70042: ABNORMAL
SITE SITE: ABNORMAL
SOURCE SOURCE: ABNORMAL

## 2017-07-08 LAB
BACTERIA UR CULT: NORMAL
SIGNIFICANT IND 70042: NORMAL
SITE SITE: NORMAL
SOURCE SOURCE: NORMAL

## 2017-07-09 LAB
BACTERIA BLD CULT: NORMAL
BACTERIA BLD CULT: NORMAL
SIGNIFICANT IND 70042: NORMAL
SIGNIFICANT IND 70042: NORMAL
SITE SITE: NORMAL
SITE SITE: NORMAL
SOURCE SOURCE: NORMAL
SOURCE SOURCE: NORMAL

## 2017-07-13 NOTE — ADDENDUM NOTE
Encounter addended by: Ling Sánchez R.N. on: 7/13/2017 11:37 AM<BR>     Documentation filed: Inpatient Document Flowsheet

## 2017-07-27 ENCOUNTER — RX ONLY (OUTPATIENT)
Age: 57
Setting detail: RX ONLY
End: 2017-07-27

## 2017-07-31 ENCOUNTER — RX ONLY (OUTPATIENT)
Age: 57
Setting detail: RX ONLY
End: 2017-07-31

## 2017-10-30 ENCOUNTER — APPOINTMENT (RX ONLY)
Dept: URBAN - METROPOLITAN AREA CLINIC 4 | Facility: CLINIC | Age: 57
Setting detail: DERMATOLOGY
End: 2017-10-30

## 2017-10-30 DIAGNOSIS — I87.2 VENOUS INSUFFICIENCY (CHRONIC) (PERIPHERAL): ICD-10-CM

## 2017-10-30 DIAGNOSIS — Z71.89 OTHER SPECIFIED COUNSELING: ICD-10-CM

## 2017-10-30 PROBLEM — I83.11 VARICOSE VEINS OF RIGHT LOWER EXTREMITY WITH INFLAMMATION: Status: ACTIVE | Noted: 2017-10-30

## 2017-10-30 PROBLEM — I83.12 VARICOSE VEINS OF LEFT LOWER EXTREMITY WITH INFLAMMATION: Status: ACTIVE | Noted: 2017-10-30

## 2017-10-30 PROCEDURE — 99212 OFFICE O/P EST SF 10 MIN: CPT

## 2017-10-30 PROCEDURE — ? PRESCRIPTION

## 2017-10-30 PROCEDURE — ? ADDITIONAL NOTES

## 2017-10-30 PROCEDURE — ? COUNSELING

## 2017-10-30 RX ORDER — TRIAMCINOLONE ACETONIDE 1 MG/G
CREAM TOPICAL BID
Qty: 454 | Refills: 3 | Status: ERX | COMMUNITY
Start: 2017-10-30

## 2017-10-30 RX ADMIN — TRIAMCINOLONE ACETONIDE: 1 CREAM TOPICAL at 22:01

## 2017-10-30 ASSESSMENT — LOCATION DETAILED DESCRIPTION DERM
LOCATION DETAILED: RIGHT PROXIMAL PRETIBIAL REGION
LOCATION DETAILED: RIGHT PROXIMAL CALF
LOCATION DETAILED: RIGHT DISTAL CALF
LOCATION DETAILED: LEFT DISTAL CALF
LOCATION DETAILED: LEFT PROXIMAL PRETIBIAL REGION

## 2017-10-30 ASSESSMENT — LOCATION ZONE DERM: LOCATION ZONE: LEG

## 2017-10-30 ASSESSMENT — LOCATION SIMPLE DESCRIPTION DERM
LOCATION SIMPLE: RIGHT CALF
LOCATION SIMPLE: LEFT CALF
LOCATION SIMPLE: LEFT PRETIBIAL REGION
LOCATION SIMPLE: RIGHT PRETIBIAL REGION

## 2017-10-30 NOTE — PROCEDURE: ADDITIONAL NOTES
Additional Notes: Continue compression stockings, Sarna lotion. May continue to use TAC 2-3 times per week, potential side effects discussed. Patient will consider a referral to [facility]

## 2017-10-30 NOTE — HPI: RASH (STASIS DERMATITIS)
How Severe Is It?: mild
Is This A New Presentation, Or A Follow-Up?: Follow Up Stasis Dermatitis
Additional History: Patient feels it has gone away aside from the itching. Overall, the patient states her symptoms have mostly resolved.

## 2017-12-08 ENCOUNTER — RX ONLY (OUTPATIENT)
Age: 57
Setting detail: RX ONLY
End: 2017-12-08

## 2017-12-08 RX ORDER — HYDROXYZINE HYDROCHLORIDE 25 MG/1
1-2 TABLET, FILM COATED ORAL DAILY
Qty: 60 | Refills: 1 | Status: ERX | COMMUNITY
Start: 2017-12-08

## 2018-01-19 ENCOUNTER — RX ONLY (OUTPATIENT)
Age: 58
Setting detail: RX ONLY
End: 2018-01-19

## 2018-01-19 RX ORDER — DOXEPIN HYDROCHLORIDE 25 MG/1
ONCE CAPSULE ORAL AT NIGHT
Qty: 30 | Refills: 2 | COMMUNITY
Start: 2018-01-19

## 2018-03-21 ENCOUNTER — RX ONLY (OUTPATIENT)
Age: 58
Setting detail: RX ONLY
End: 2018-03-21

## 2018-03-21 RX ORDER — DOXEPIN HYDROCHLORIDE 25 MG/1
CAPSULE ORAL AT NIGHT
Qty: 30 | Refills: 2 | Status: ERX

## 2022-04-04 NOTE — PROGRESS NOTES
Pt has been given discharge paperwork and prescriptions.  Pt verbalized understanding of paperwork and of additions/changes to medication regimen.  Pt PIV d/c'd, tip intact.  Pt leaving with , walking to private vehicle home.     Detail Level: Detailed